# Patient Record
Sex: MALE | Race: WHITE | Employment: STUDENT | URBAN - METROPOLITAN AREA
[De-identification: names, ages, dates, MRNs, and addresses within clinical notes are randomized per-mention and may not be internally consistent; named-entity substitution may affect disease eponyms.]

---

## 2017-01-25 DIAGNOSIS — F90.2 ATTENTION DEFICIT HYPERACTIVITY DISORDER (ADHD), COMBINED TYPE: Primary | ICD-10-CM

## 2017-01-25 RX ORDER — METHYLPHENIDATE HYDROCHLORIDE 10 MG/1
TABLET ORAL
Qty: 60 TABLET | Refills: 0 | Status: SHIPPED | OUTPATIENT
Start: 2017-01-25 | End: 2017-03-14

## 2017-01-25 RX ORDER — METHYLPHENIDATE HYDROCHLORIDE 10 MG/1
TABLET ORAL
Qty: 60 TABLET | Refills: 0 | Status: SHIPPED | OUTPATIENT
Start: 2017-01-25 | End: 2017-04-04

## 2017-03-14 DIAGNOSIS — F90.2 ATTENTION DEFICIT HYPERACTIVITY DISORDER (ADHD), COMBINED TYPE: ICD-10-CM

## 2017-03-14 RX ORDER — METHYLPHENIDATE HYDROCHLORIDE 10 MG/1
TABLET ORAL
Qty: 60 TABLET | Refills: 0 | Status: SHIPPED | OUTPATIENT
Start: 2017-03-14 | End: 2017-05-01

## 2017-03-28 DIAGNOSIS — F90.2 ATTENTION DEFICIT HYPERACTIVITY DISORDER (ADHD), COMBINED TYPE: ICD-10-CM

## 2017-03-28 DIAGNOSIS — F41.1 ANXIETY STATE: ICD-10-CM

## 2017-04-04 DIAGNOSIS — F90.2 ATTENTION DEFICIT HYPERACTIVITY DISORDER (ADHD), COMBINED TYPE: ICD-10-CM

## 2017-04-05 RX ORDER — METHYLPHENIDATE HYDROCHLORIDE 10 MG/1
TABLET ORAL
Qty: 60 TABLET | Refills: 0 | Status: SHIPPED | OUTPATIENT
Start: 2017-04-05 | End: 2017-05-24

## 2017-05-01 DIAGNOSIS — F90.2 ATTENTION DEFICIT HYPERACTIVITY DISORDER (ADHD), COMBINED TYPE: ICD-10-CM

## 2017-05-02 RX ORDER — METHYLPHENIDATE HYDROCHLORIDE 10 MG/1
TABLET ORAL
Qty: 60 TABLET | Refills: 0 | Status: SHIPPED | OUTPATIENT
Start: 2017-05-02 | End: 2017-06-29

## 2017-05-24 DIAGNOSIS — F90.2 ATTENTION DEFICIT HYPERACTIVITY DISORDER (ADHD), COMBINED TYPE: ICD-10-CM

## 2017-05-24 RX ORDER — METHYLPHENIDATE HYDROCHLORIDE 10 MG/1
TABLET ORAL
Qty: 60 TABLET | Refills: 0 | Status: SHIPPED | OUTPATIENT
Start: 2017-05-24 | End: 2017-06-29

## 2017-06-06 ENCOUNTER — OFFICE VISIT (OUTPATIENT)
Dept: PEDIATRICS | Facility: CLINIC | Age: 22
End: 2017-06-06
Attending: PEDIATRICS
Payer: MEDICAID

## 2017-06-06 VITALS
SYSTOLIC BLOOD PRESSURE: 140 MMHG | HEIGHT: 76 IN | HEART RATE: 89 BPM | WEIGHT: 181 LBS | BODY MASS INDEX: 22.04 KG/M2 | DIASTOLIC BLOOD PRESSURE: 82 MMHG

## 2017-06-06 DIAGNOSIS — F90.2 ATTENTION DEFICIT HYPERACTIVITY DISORDER (ADHD), COMBINED TYPE: Primary | ICD-10-CM

## 2017-06-06 DIAGNOSIS — F41.1 ANXIETY STATE: ICD-10-CM

## 2017-06-06 PROCEDURE — 99212 OFFICE O/P EST SF 10 MIN: CPT | Mod: ZF

## 2017-06-06 RX ORDER — LISDEXAMFETAMINE DIMESYLATE 50 MG/1
CAPSULE ORAL
Qty: 30 CAPSULE | Refills: 0 | Status: SHIPPED | OUTPATIENT
Start: 2017-06-06 | End: 2017-06-12

## 2017-06-06 ASSESSMENT — PAIN SCALES - GENERAL: PAINLEVEL: NO PAIN (0)

## 2017-06-06 NOTE — PATIENT INSTRUCTIONS
Schedule a return appointment in 4-6 months    Return scheduling phone number:  [phone]    Shayna Lopez RN:  [phone]  Clinic Care Coordinator    After hours emergency phone number:  [phone] Ask to have Dr. Peoples called

## 2017-06-06 NOTE — MR AVS SNAPSHOT
After Visit Summary   6/6/2017    Latanya Dennis    MRN: [id number]           Patient Information     Date Of Birth          1995        Visit Information        Provider Department      6/6/2017 12:10 PM Trevon Peoples MD [facility]        Today's Diagnoses     Attention deficit hyperactivity disorder (ADHD), combined type    -  1    Anxiety state          Care Instructions    Schedule a return appointment in 4-6 months    Return scheduling phone number:  [phone]    Shayna Lopez RN:  [phone]  Clinic Care Coordinator    After hours emergency phone number:  [phone] Ask to have Dr. Peoples called                Follow-ups after your visit        Your next 10 appointments already scheduled     Sep 19, 2017 10:10 AM CDT   Return Developmental or Behavioral with Trevon Peoples MD   [facility] ([facility])    [address] [place] [address] [address] [place] [address]   [phone]              Who to contact     Please call your clinic at [phone] to:    Ask questions about your health    Make or cancel appointments    Discuss your medicines    Learn about your test results    Speak to your doctor   If you have compliments or concerns about an experience at your clinic, or if you wish to file a complaint, please contact [facility] Patient Relations at [phone] or email us at [email or web address]         Additional Information About Your Visit        MyChart Information     [employer] is an electronic gateway that provides easy, online access to your medical records. With [employer], you can request a clinic appointment, read your test results, renew a prescription or communicate with your care team.     To sign up for [employer] visit the website at [email or web address]   You will be asked to enter the access code listed below, as well as some personal information. Please follow the directions to  "create your username and password.     Your access code is: [email or web address]  Expires: 2017 12:39 PM     Your access code will  in 90 days. If you need help or a new code, please contact your [facility] or call [phone] for assistance.        Care EveryWhere ID     This is your Care EveryWhere ID. This could be used by other organizations to access your [place] medical records  [id number]        Your Vitals Were     Pulse Height BMI (Body Mass Index)             83 6' 3.51\" (191.8 cm) 22.32 kg/m2          Blood Pressure from Last 3 Encounters:   17 131/84   10/17/16 122/77   16 122/70    Weight from Last 3 Encounters:   17 181 lb (82.1 kg) (81 %)*   10/17/16 188 lb 7.9 oz (85.5 kg) (88 %)*   16 189 lb 13.1 oz (86.1 kg) (89 %)*     * Growth percentiles are based on [facility] 2-20 Years data.              Today, you had the following     No orders found for display         Today's Medication Changes          These changes are accurate as of: 17 11:59 PM.  If you have any questions, ask your nurse or doctor.               Start taking these medicines.        Dose/Directions    lisdexamfetamine 50 MG capsule   Commonly known as:  VYVANSE   Used for:  Attention deficit hyperactivity disorder (ADHD), combined type   Started by:  Trevon Peoples MD        Take 1 in AM   Quantity:  30 capsule   Refills:  0            Where to get your medicines      Some of these will need a paper prescription and others can be bought over the counter.  Ask your nurse if you have questions.     Bring a paper prescription for each of these medications     lisdexamfetamine 50 MG capsule                Primary Care Provider Office Phone # Fax #    Laith Das [phone]       [facility] [address] [place] [address]        Equal Access to Services     CHRISTOPHER MITTAL AH: Hadii augustin Aaron, otoniel guthrie, qaybdianne perez " gerry vidalesserenity mcguireaajade ah. [facility] [phone].    ATENCIÓN: Si denisse valdes, tiene a neumann disposición servicios gratuitos de asistencia lingüística. Karrie al [phone].    We comply with applicable federal civil rights laws and [place] laws. We do not discriminate on the basis of race, color, national origin, age, disability sex, sexual orientation or gender identity.            Thank you!     Thank you for choosing [facility]  for your care. Our goal is always to provide you with excellent care. Hearing back from our patients is one way we can continue to improve our services. Please take a few minutes to complete the written survey that you may receive in the mail after your visit with us. Thank you!             Your Updated Medication List - Protect others around you: Learn how to safely use, store and throw away your medicines at www.disposemymeds.org.          This list is accurate as of: 6/6/17 11:59 PM.  Always use your most recent med list.                   Brand Name Dispense Instructions for use Diagnosis    lisdexamfetamine 50 MG capsule    VYVANSE    30 capsule    Take 1 in AM    Attention deficit hyperactivity disorder (ADHD), combined type       * methylphenidate 10 MG tablet    RITALIN    60 tablet    Take 1 1/2 to 2 tablets daily    Attention deficit hyperactivity disorder (ADHD), combined type       * methylphenidate 10 MG tablet    RITALIN    60 tablet    Take 1 1/2 to 2 tablets daily    Attention deficit hyperactivity disorder (ADHD), combined type       * Notice:  This list has 2 medication(s) that are the same as other medications prescribed for you. Read the directions carefully, and ask your doctor or other care provider to review them with you.

## 2017-06-06 NOTE — PROGRESS NOTES
I met with Felix and his father today.  Felix is now 21.  He had enough credits to be graduated from high school.  He is now in a job training kind of program.  He worked in [employer] for a few months.  He has subsequently been working at [employer] and then a [employer] inconsistently.  An issue has been his difficulty in focusing even though the rest of the work situation has gone fairly well for him.  The assessment at his point is that he will have difficulty working at an independent job without more discipline.  We discussed the possibility of trying stimulant again, and he will try Vyvanse 50 mg and stop taking the Ritalin 20 mg that he is taking daily.  He will do this after a few weeks because he [place] end up in a more permanent foster care situation with a family whom his parents know.  There are horses at the placement and it would be a good experience and foster more independence.  Presently, Felix is quite dependent on his parents, particularly his father.        Felix is working on life skills.  This is supported through a [employer] waiver.      Felix now has a girlfriend and because neither of them drive, their parents take them places to go to a movie or otherwise get together.      Felix also continues seeing his counselor in [place].  He will continue with this counselor particularly around the issue of boundaries with his girlfriend.      We will continue to follow Felix through these transitions.      PHYSICAL FINDINGS:   Height 98th percentile.  Weight 81st percentile.  Blood pressure 131/84.  Heart rate 83.       ASSESSMENT/PLAN:   Assessment at this point is ADHD and anxiety.  He also will qualify for intellectual disability more because of adaptive behavior than actual IQ scores.  I will see him back in 4-6 months.      Over half of this 40 minute visit was used for counseling.      Trevon Peoples MD

## 2017-06-06 NOTE — LETTER
6/6/2017      RE: Latanya Dennis  [address]  [place] [address]     Dear Colleague,    Thank you for the opportunity to participate in the care of your patient, Latanya Dennis, at the [facility] at [facility]. Please see a copy of my visit note below.    I met with Felix and his father today.  Felix is now 21.  He had enough credits to be graduated from high school.  He is now in a job training kind of program.  He worked in [employer] for a few months.  He has subsequently been working at [employer] and then a [employer] inconsistently.  An issue has been his difficulty in focusing even though the rest of the work situation has gone fairly well for him.  The assessment at his point is that he will have difficulty working at an independent job without more discipline.  We discussed the possibility of trying stimulant again, and he will try Vyvanse 50 mg and stop taking the Ritalin 20 mg that he is taking daily.  He will do this after a few weeks because he will be in a respite [place] where he may end up in a more permanent foster care situation with a family whom his parents know.  There are horses at the placement and it would be a good experience and foster more independence.  Presently, Felix is quite dependent on his parents, particularly his father.        Felix is working on life skills.  This is supported through a  waiver.      Felix now has a girlfriend and because neither of them drive, their parents take them places to go to a movie or otherwise get together.      Felix also continues seeing his counselor in [place].  He will continue with this counselor particularly around the issue of boundaries with his girlfriend.      We will continue to follow Felix through these transitions.      PHYSICAL FINDINGS:   Height 98th percentile.  Weight 81st percentile.  Blood pressure 131/84.  Heart rate 83.        ASSESSMENT/PLAN:   Assessment at this point is ADHD and anxiety.  He also will qualify for intellectual disability more because of adaptive behavior than actual IQ scores.  I will see him back in 4-6 months.      Over half of this 40 minute visit was used for counseling.      Trevon Peoples MD

## 2017-06-06 NOTE — NURSING NOTE
"Chief Complaint   Patient presents with     Eval/Assessment     follow up for anxiety and adhd     Accompanied to apt by father , Ht, Wt, VS obtained.  Medication documented, Pharmacy noted  /84  Pulse 83  Ht 6' 3.51\" (191.8 cm)  Wt 181 lb (82.1 kg)  BMI 22.32 kg/m2    "

## 2017-06-12 DIAGNOSIS — F90.2 ATTENTION DEFICIT HYPERACTIVITY DISORDER (ADHD), COMBINED TYPE: ICD-10-CM

## 2017-06-12 RX ORDER — LISDEXAMFETAMINE DIMESYLATE 50 MG/1
CAPSULE ORAL
Qty: 30 CAPSULE | Refills: 0 | Status: SHIPPED | OUTPATIENT
Start: 2017-06-12 | End: 2017-06-29

## 2017-06-26 DIAGNOSIS — F90.2 ATTENTION DEFICIT HYPERACTIVITY DISORDER (ADHD), COMBINED TYPE: ICD-10-CM

## 2017-06-26 DIAGNOSIS — F41.1 ANXIETY STATE: ICD-10-CM

## 2017-06-29 DIAGNOSIS — F90.2 ATTENTION DEFICIT HYPERACTIVITY DISORDER (ADHD), COMBINED TYPE: ICD-10-CM

## 2017-07-03 RX ORDER — LISDEXAMFETAMINE DIMESYLATE 50 MG/1
CAPSULE ORAL
Qty: 30 CAPSULE | Refills: 0 | Status: SHIPPED | OUTPATIENT
Start: 2017-07-03 | End: 2017-08-15

## 2017-07-03 RX ORDER — LISDEXAMFETAMINE DIMESYLATE 50 MG/1
50 CAPSULE ORAL EVERY MORNING
Qty: 30 CAPSULE | Refills: 0 | Status: SHIPPED | OUTPATIENT
Start: 2017-07-03 | End: 2017-08-15

## 2017-08-15 DIAGNOSIS — F90.2 ATTENTION DEFICIT HYPERACTIVITY DISORDER (ADHD), COMBINED TYPE: ICD-10-CM

## 2017-08-15 RX ORDER — LISDEXAMFETAMINE DIMESYLATE 50 MG/1
50 CAPSULE ORAL EVERY MORNING
Qty: 30 CAPSULE | Refills: 0 | Status: SHIPPED | OUTPATIENT
Start: 2017-08-15 | End: 2017-10-23

## 2017-08-15 RX ORDER — LISDEXAMFETAMINE DIMESYLATE 50 MG/1
CAPSULE ORAL
Qty: 30 CAPSULE | Refills: 0 | Status: SHIPPED | OUTPATIENT
Start: 2017-08-15 | End: 2017-10-23

## 2017-08-31 ENCOUNTER — TELEPHONE (OUTPATIENT)
Dept: PEDIATRICS | Facility: CLINIC | Age: 22
End: 2017-08-31

## 2017-08-31 NOTE — TELEPHONE ENCOUNTER
Spoke with Katelyn, care provided she is concerned since Latanya has been in there home July and been on the vyvanse he has lost 10lbs.  Discussed increase in calories, ensure as supplements.  She will discuss with cadi worker.  ALEJANDRA at this time

## 2017-09-19 ENCOUNTER — OFFICE VISIT (OUTPATIENT)
Dept: PEDIATRICS | Facility: CLINIC | Age: 22
End: 2017-09-19
Attending: PEDIATRICS
Payer: MEDICAID

## 2017-09-19 VITALS
SYSTOLIC BLOOD PRESSURE: 127 MMHG | HEART RATE: 92 BPM | BODY MASS INDEX: 20.48 KG/M2 | WEIGHT: 168.21 LBS | HEIGHT: 76 IN | DIASTOLIC BLOOD PRESSURE: 75 MMHG

## 2017-09-19 DIAGNOSIS — R48.0 ALEXIA AND DYSLEXIA: ICD-10-CM

## 2017-09-19 DIAGNOSIS — F41.1 ANXIETY STATE: ICD-10-CM

## 2017-09-19 DIAGNOSIS — F90.2 ATTENTION DEFICIT HYPERACTIVITY DISORDER (ADHD), COMBINED TYPE: Primary | ICD-10-CM

## 2017-09-19 PROCEDURE — 99212 OFFICE O/P EST SF 10 MIN: CPT | Mod: ZF

## 2017-09-19 ASSESSMENT — PAIN SCALES - GENERAL: PAINLEVEL: NO PAIN (0)

## 2017-09-19 NOTE — PATIENT INSTRUCTIONS
Schedule a return appointment as needed    Return scheduling phone number:  [phone]    Shayna Lopez RN:  [phone]  Clinic Care Coordinator    After hours emergency phone number:  [phone] Ask to have Dr. Peoples called

## 2017-09-19 NOTE — PROGRESS NOTES
I met with Felix and his father.    Felix has transferred to an adult foster care family.  He is also working at [employer].  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      Felix was put on 50 mg Vyvanse right before he moved to the new placement and lost about 10 pounds.  It is unclear whether he is starting to gain it back.  I suggested that he stop the Vyvanse and look at his work performance.  If it has not changed I would recommend that he stays off medication.  If his performance declines I recommend that he go back on another stimulant.    Leigh father is trying to get him on Social Security.  There have been some misunderstandings at the social security office and this note will contain his diagnostic formulation to document his disabilities.    Otherwise Felix is presently stable.  We discussed transitioning to adult care.    Physical findings:  Wt 76.32 kg; Ht 192.8  cm  /84  HR 97    Assessment: ADHD combined presentation, anxiety disorder, dyslexia/alexia; Well below average adaptive behavior with low average cognitive testing suggesting mild intellectual disability and neuropsychology diagnosis of static encephalopathy.    Plan as above.    Over half of this 40 minute visit was used for counseling    CC: parents of Latanya Dennis

## 2017-09-19 NOTE — LETTER
9/19/2017      RE: [place] [place] [place] [address]     Dear Colleague,    Thank you for the opportunity to participate in the care of your patient, Latanya Dennis, at the [facility] at [facility]. Please see a copy of my visit note below.    I met with Felix and his father.    Felix has transferred to an adult foster care family.  He is also working at [employer].  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      Felix was put on 50 mg Vyvanse right before he moved to the new placement and lost about 10 pounds.  It is unclear whether he is starting to gain it back.  I suggested that he stop the Vyvanse and look at his work performance.  If it has not changed I would recommend that he stays off medication.  If his performance declines I recommend that he go back on another stimulant.    Leigh father is trying to get him on Social Security.  There have been some misunderstandings at the social security office and this note will contain his diagnostic formulation to document his disabilities.    Otherwise Felix is presently stable.  We discussed transitioning to adult care.    Physical findings:  Wt 76.32 kg; Ht 192.8  cm  /84  HR 97    Assessment: ADHD combined presentation, anxiety disorder, dyslexia/alexia; Well below average adaptive behavior with low average cognitive testing suggesting mild intellectual disability and neuropsychology diagnosis of static encephalopathy.    Plan as above.    Over half of this 40 minute visit was used for counseling      Please do not hesitate to contact me if you have any questions/concerns.     Sincerely,       Trevon Peoples MD    CC:   Parent(s) of Latanya [address] [address] [place] [address]

## 2017-09-19 NOTE — NURSING NOTE
"Chief Complaint   Patient presents with     Eval/Assessment     follow up for adhd     Accompanied to apt by father, Ht, Wt, VS obtained.  Medication documented, Pharmacy noted  /75  Pulse 92  Ht 6' 3.91\" (192.8 cm)  Wt 168 lb 3.4 oz (76.3 kg)  BMI 20.53 kg/m2    "

## 2017-09-27 DIAGNOSIS — F90.2 ATTENTION DEFICIT HYPERACTIVITY DISORDER (ADHD), COMBINED TYPE: ICD-10-CM

## 2017-09-27 DIAGNOSIS — F41.1 ANXIETY STATE: ICD-10-CM

## 2017-10-19 ENCOUNTER — TELEPHONE (OUTPATIENT)
Dept: PEDIATRICS | Facility: CLINIC | Age: 22
End: 2017-10-19

## 2017-10-19 DIAGNOSIS — F90.2 ATTENTION DEFICIT HYPERACTIVITY DISORDER (ADHD), COMBINED TYPE: ICD-10-CM

## 2017-10-19 NOTE — TELEPHONE ENCOUNTER
Father called to say that both Latanya and him have discussed and are in agreement for him to try adderall xr, father stated he would leave the dosing up to you knowing Latanya's response to vyvanse if he would be cautious.  Called father back to discuss   Father is wondering about a time frame if they would know if it is working for Latanya, he is also asking if they should do check lists again when he is on the medication.  His work crew is stating he is scattered and they have to do more 1 to 1 and he is going 100 miles an hour.   Care givers and work would like him to be able to be less scattered and do a good job.        Felix had stated he did not feel well on the vyvanse-.otherwise father would have suggested reducing vyvanse    fathers number [phone],

## 2017-10-23 DIAGNOSIS — F90.2 ADHD (ATTENTION DEFICIT HYPERACTIVITY DISORDER), COMBINED TYPE: Primary | ICD-10-CM

## 2017-10-23 DIAGNOSIS — F90.2 ATTENTION DEFICIT HYPERACTIVITY DISORDER (ADHD), COMBINED TYPE: ICD-10-CM

## 2017-10-23 RX ORDER — DEXTROAMPHETAMINE SACCHARATE, AMPHETAMINE ASPARTATE MONOHYDRATE, DEXTROAMPHETAMINE SULFATE AND AMPHETAMINE SULFATE 5; 5; 5; 5 MG/1; MG/1; MG/1; MG/1
20 CAPSULE, EXTENDED RELEASE ORAL DAILY
Qty: 30 CAPSULE | Refills: 0 | Status: SHIPPED | OUTPATIENT
Start: 2017-10-23 | End: 2018-01-03

## 2017-10-23 RX ORDER — DEXTROAMPHETAMINE SACCHARATE, AMPHETAMINE ASPARTATE MONOHYDRATE, DEXTROAMPHETAMINE SULFATE AND AMPHETAMINE SULFATE 5; 5; 5; 5 MG/1; MG/1; MG/1; MG/1
20 CAPSULE, EXTENDED RELEASE ORAL DAILY
Qty: 30 CAPSULE | Refills: 0 | Status: CANCELLED | OUTPATIENT
Start: 2017-10-23

## 2017-10-31 ENCOUNTER — TELEPHONE (OUTPATIENT)
Dept: PEDIATRICS | Facility: CLINIC | Age: 22
End: 2017-10-31

## 2017-10-31 NOTE — TELEPHONE ENCOUNTER
Doing ok today on the adderall xr.  We discussed how he is probably just adjusting to it.  Call back with any further issues.
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
## 2017-11-19 DIAGNOSIS — F90.2 ATTENTION DEFICIT HYPERACTIVITY DISORDER (ADHD), COMBINED TYPE: ICD-10-CM

## 2017-11-20 RX ORDER — LISDEXAMFETAMINE DIMESYLATE 30 MG/1
30 CAPSULE ORAL EVERY MORNING
Qty: 30 CAPSULE | Refills: 0 | Status: SHIPPED | OUTPATIENT
Start: 2017-11-20 | End: 2018-01-03

## 2017-11-20 NOTE — TELEPHONE ENCOUNTER
Father called to say he had met with care givers and have come to the decision to retry vyvanse at 30mg, adderall was making him basilio, ups and downs, difficulty sleeping and aggression.  Fathers number if not approved [phone]

## 2018-01-03 DIAGNOSIS — F90.2 ATTENTION DEFICIT HYPERACTIVITY DISORDER (ADHD), COMBINED TYPE: ICD-10-CM

## 2018-01-04 RX ORDER — LISDEXAMFETAMINE DIMESYLATE 30 MG/1
30 CAPSULE ORAL EVERY MORNING
Qty: 30 CAPSULE | Refills: 0 | Status: SHIPPED | OUTPATIENT
Start: 2018-01-04 | End: 2018-02-04

## 2018-01-04 RX ORDER — LISDEXAMFETAMINE DIMESYLATE 30 MG/1
30 CAPSULE ORAL EVERY MORNING
Qty: 30 CAPSULE | Refills: 0
Start: 2018-01-04 | End: 2018-01-04

## 2018-01-08 RX ORDER — LISDEXAMFETAMINE DIMESYLATE 30 MG/1
30 CAPSULE ORAL EVERY MORNING
Qty: 30 CAPSULE | Refills: 0 | Status: SHIPPED | OUTPATIENT
Start: 2018-01-08 | End: 2018-02-04

## 2018-02-04 DIAGNOSIS — F90.2 ATTENTION DEFICIT HYPERACTIVITY DISORDER (ADHD), COMBINED TYPE: ICD-10-CM

## 2018-02-07 RX ORDER — LISDEXAMFETAMINE DIMESYLATE 30 MG/1
30 CAPSULE ORAL EVERY MORNING
Qty: 30 CAPSULE | Refills: 0 | Status: SHIPPED | OUTPATIENT
Start: 2018-02-07